# Patient Record
Sex: MALE | Race: WHITE
[De-identification: names, ages, dates, MRNs, and addresses within clinical notes are randomized per-mention and may not be internally consistent; named-entity substitution may affect disease eponyms.]

---

## 2017-04-12 ENCOUNTER — HOSPITAL ENCOUNTER (OUTPATIENT)
Dept: HOSPITAL 80 - SBRMNEURO | Age: 52
End: 2017-04-12
Attending: PSYCHIATRY & NEUROLOGY
Payer: COMMERCIAL

## 2017-04-12 DIAGNOSIS — G47.61: ICD-10-CM

## 2017-04-12 DIAGNOSIS — G47.33: Primary | ICD-10-CM

## 2017-07-22 ENCOUNTER — HOSPITAL ENCOUNTER (OUTPATIENT)
Dept: HOSPITAL 80 - FIMAGING | Age: 52
End: 2017-07-22
Attending: PSYCHIATRY & NEUROLOGY
Payer: COMMERCIAL

## 2017-07-22 DIAGNOSIS — M47.892: ICD-10-CM

## 2017-07-22 DIAGNOSIS — G35: Primary | ICD-10-CM

## 2017-07-22 PROCEDURE — A9585 GADOBUTROL INJECTION: HCPCS

## 2018-01-05 ENCOUNTER — HOSPITAL ENCOUNTER (EMERGENCY)
Dept: HOSPITAL 80 - FED | Age: 53
Discharge: PSYCHIATRIC OR SUBSTANCE USE FACILITY | End: 2018-01-05
Payer: COMMERCIAL

## 2018-01-05 VITALS
SYSTOLIC BLOOD PRESSURE: 117 MMHG | TEMPERATURE: 98.2 F | HEART RATE: 79 BPM | DIASTOLIC BLOOD PRESSURE: 84 MMHG | RESPIRATION RATE: 16 BRPM

## 2018-01-05 VITALS — OXYGEN SATURATION: 93 %

## 2018-01-05 DIAGNOSIS — F10.220: Primary | ICD-10-CM

## 2018-01-05 LAB
PLATELET # BLD: (no result) 10^3/UL (ref 150–400)
PLATELET # BLD: 162 10^3/UL (ref 150–400)

## 2018-01-05 PROCEDURE — G0480 DRUG TEST DEF 1-7 CLASSES: HCPCS

## 2018-01-05 NOTE — EDPHY
H & P


Time Seen by Provider: 01/05/18 21:32


HPI/ROS: 


HPI:  This is a 52-year-old male presents with





Chief Complaint: alcohol detox





Location: psych 


Quality: alcohol detox 


Duration:  8 years


Signs and Symptoms:  No fever, no suicidal ideation, no homicidal ideation, + 

anxiety, + nausea but no vomiting, + mild dull aching headache, 


Timing:  Chronic


Severity:  Moderate-severe


Context:  Patient drinks approximately 12 shots of Tequila daily for the last 8 

years presents with his wife and daughter asking for alcohol detox program and 

generalized medical workup prior for clearance.  Last drink was 13 oz Tequila 2 

hours prior to arrival in the ER. Patient reports that he was admitted earlier 

this year overnight and did not have alcohol withdrawal seizures.  Patient 

reports that he even started drinking in the morning the last week.  He is 

employed.  A him in his wife for currently in counseling and trying to work on 

the relationship.  He reports extreme anxiety but denies suicidal ideation/

homicidal ideation/hallucinations.  He has a diagnosis of multiple sclerosis 

had been on Capsone for several years but has been off of it the last 2 years.  

Patient reports mild tactile disturbances with sweaty palms. 


Modifying Factors:  None





Comment: 








ROS: see HPI


Constitutional: No fever, no chills, no weight loss


Eyes:  No blurred vision


Respiratory:  No shortness of breath, no cough


Cardiovascular:  No chest pain


Gastrointestinal:  + nausea, no vomiting, no diarrhea 


Genitourinary:  No dysuria 


Extremities:  No myalgias 


Neurologic:  No weakness, no numbness


Skin:  No rashes


Hematologic:  No bruising, no bleeding





MEDICAL/SURGICAL/SOCIAL HISTORY: 


Medical history:  Multiple sclerosis


Surgical history:  [place] plateau surgery


Social history:  .  Employed











CONSTITUTIONAL:  Tidy, cooperative, polite adult white male, awake and alert, 

no obvious distress


HEENT: Atraumatic and normocephalic, PERRL, EOMI. Tympanic membranes clear. 

Oropharynx clear, no exudate and moist pink mucosa.  Airway patent.  No 

lymphadenopathy.  No meningismus.


Cardiovascular: Normal S1/S2, tachycardia, regular rhythm, without murmur rub 

or gallop.


PULMONARY/CHEST:  Symmetrical and nontender. Clear to auscultation bilaterally. 

Good air movement. No accessory muscle usage.


ABDOMEN:  Soft, nondistended, nontender, no rebound, no guarding, no peritoneal 

signs, no masses or organomegaly. No CVAT.


EXTREMITIES:  2/2 pulses, strength 5/5, no deformities, no clubbing, no 

cyanosis or edema.


NEUROLOGICAL: no focal neuro deficits.  GCS 15.


SKIN: Warm and dry, no erythema. no rash.  Good capillary refill. 


PSYCH: Poor eye contact, no flight of ideas, organized thought process, 

relatively good insight and judgment, no auditory and visual command 

hallucinations, no suicidal ideation with a plan, no homicidal ideation, no 

paranoia 








Source: Patient, Family (Wife)


Exam Limitations: No limitations


Constitutional: 


 Initial Vital Signs











Temperature (C)  36.9 C   01/05/18 21:30


 


Heart Rate  104 H  01/05/18 21:30


 


Respiratory Rate  18   01/05/18 21:30


 


Blood Pressure  132/93 H  01/05/18 21:30


 


O2 Sat (%)  91 L  01/05/18 21:30








 











O2 Delivery Mode               Room Air


 


O2 (L/minute)                  2














Allergies/Adverse Reactions: 


 





Penicillins Allergy (Verified 01/05/18 21:36)


 


sulfamethoxazole [From Bactrim] Allergy (Verified 01/05/18 21:36)


 


trimethoprim [From Bactrim] Allergy (Verified 01/05/18 21:36)


 








Home Medications: 














 Medication  Instructions  Recorded


 


NK [No Known Home Meds]  01/05/18














Medical Decision Making


ED Course/Re-evaluation: 


Labs, UDS and oral medications given


Given p.o. Librium 25 mg x1. 


Does not meet Detainer or M1 criteria. 


CIWA=1 


Labs reviewed; medically clear


UDS negative


Ethanol 273


TLC consult; [facility] will intake patient first thing in the morning. 

Wife contracts for safety and will drive patient to [facility]. 








This patient was seen under the supervision of my secondary supervising 

physician.  I evaluated care for this patient independently.  Discussed this 

patient with Dr. Bailey who did not see the patient.  Patient's presentation, 

labs/imaging, treatment and plan of care were discussed with secondary 

supervising physician. 


























Differential Diagnosis: 


Differential diagnosis includes but is not limited to alcohol abuse, alcohol 

intoxication, anxiety, functional and situational depression.








- Data Points


Laboratory Results: 


 Laboratory Results





 01/05/18 22:44 





 01/05/18 22:44 





 











  01/05/18 01/05/18 01/05/18





  22:44 22:44 22:06


 


WBC  7.62 10^3/uL 10^3/uL    





   (3.80-9.50)   


 


RBC  4.61 10^6/uL 10^6/uL    





   (4.40-6.38)   


 


Hgb  16.2 g/dL g/dL    





   (13.7-17.5)   


 


Hct  44.0 % %    





   (40.0-51.0)   


 


MCV  95.4 fL fL    





   (81.5-99.8)   


 


MCH  35.1 pg H pg    





   (27.9-34.1)   


 


MCHC  36.8 g/dL H g/dL    





   (32.4-36.7)   


 


RDW  11.7 % %    





   (11.5-15.2)   


 


Plt Count  162 10^3/uL 10^3/uL    





   (150-400)   


 


MPV  8.3 fL L fL    





   (8.7-11.7)   


 


Neut % (Auto)  35.4 % L %    





   (39.3-74.2)   


 


Lymph % (Auto)  48.3 % H %    





   (15.0-45.0)   


 


Mono % (Auto)  13.3 % H %    





   (4.5-13.0)   


 


Eos % (Auto)  1.7 % %    





   (0.6-7.6)   


 


Baso % (Auto)  0.9 % %    





   (0.3-1.7)   


 


Nucleat RBC Rel Count  0.0 % %    





   (0.0-0.2)   


 


Absolute Neuts (auto)  2.70 10^3/uL 10^3/uL    





   (1.70-6.50)   


 


Absolute Lymphs (auto)  3.68 10^3/uL H 10^3/uL    





   (1.00-3.00)   


 


Absolute Monos (auto)  1.01 10^3/uL H 10^3/uL    





   (0.30-0.80)   


 


Absolute Eos (auto)  0.13 10^3/uL 10^3/uL    





   (0.03-0.40)   


 


Absolute Basos (auto)  0.07 10^3/uL 10^3/uL    





   (0.02-0.10)   


 


Absolute Nucleated RBC  0.00 10^3/uL 10^3/uL    





   (0-0.01)   


 


Immature Gran %  0.4 % %    





   (0.0-1.1)   


 


Immature Gran #  0.03 10^3/uL 10^3/uL    





   (0.00-0.10)   


 


Sodium    147 mEq/L H mEq/L  





    (134-144)  


 


Potassium    4.2 mEq/L mEq/L  





    (3.5-5.2)  


 


Chloride    106 mEq/L mEq/L  





    ()  


 


Carbon Dioxide    23 mEq/l mEq/l  





    (22-31)  


 


Anion Gap    18 mEq/L H mEq/L  





    (8-16)  


 


BUN    6 mg/dL L mg/dL  





    (7-23)  


 


Creatinine    0.7 mg/dL mg/dL  





    (0.7-1.3)  


 


Estimated GFR    > 60   





    


 


Glucose    77 mg/dL mg/dL  





    ()  


 


Calcium    9.3 mg/dL mg/dL  





    (8.5-10.4)  


 


Total Bilirubin    1.0 mg/dL mg/dL  





    (0.1-1.4)  


 


Conjugated Bilirubin    0.4 mg/dL mg/dL  





    (0.0-0.5)  


 


Unconjugated Bilirubin    0.6 mg/dL mg/dL  





    (0.0-1.1)  


 


AST    158 IU/L H IU/L  





    (17-59)  


 


ALT    180 IU/L H IU/L  





    (21-72)  


 


Alkaline Phosphatase    73 IU/L IU/L  





    ()  


 


Total Protein    7.4 g/dL g/dL  





    (6.3-8.2)  


 


Albumin    4.6 g/dL g/dL  





    (3.5-5.0)  


 


Urine Opiates Screen      NEGATIVE 





     (NEGATIVE) 


 


Urine Barbiturates      NEGATIVE 





     (NEGATIVE) 


 


Ur Phencyclidine Scrn      NEGATIVE 





     (NEGATIVE) 


 


Ur Amphetamine Screen      NEGATIVE 





     (NEGATIVE) 


 


U Benzodiazepines Scrn      NEGATIVE 





     (NEGATIVE) 


 


Urine Cocaine Screen      NEGATIVE 





     (NEGATIVE) 


 


U Marijuana (THC) Screen      NEGATIVE 





     (NEGATIVE) 


 


Ethyl Alcohol    273 mg/dL H mg/dL  





    (0-10)  














  01/05/18





  22:06


 


WBC  TNP 





  


 


RBC  TNP 





  


 


Hgb  TNP 





  


 


Hct  TNP 





  


 


MCV  TNP 





  


 


MCH  TNP 





  


 


MCHC  TNP 





  


 


RDW  TNP 





  


 


Plt Count  TNP 





  


 


MPV  TNP 





  


 


Neut % (Auto)  TNP 





  


 


Lymph % (Auto)  TNP 





  


 


Mono % (Auto)  TNP 





  


 


Eos % (Auto)  TNP 





  


 


Baso % (Auto)  TNP 





  


 


Nucleat RBC Rel Count  TNP 





  


 


Absolute Neuts (auto)  TNP 





  


 


Absolute Lymphs (auto)  TNP 





  


 


Absolute Monos (auto)  TNP 





  


 


Absolute Eos (auto)  TNP 





  


 


Absolute Basos (auto)  TNP 





  


 


Absolute Nucleated RBC  TNP 





  


 


Immature Gran %  TNP 





  


 


Immature Gran #  TNP 





  


 


Sodium  





  


 


Potassium  





  


 


Chloride  





  


 


Carbon Dioxide  





  


 


Anion Gap  





  


 


BUN  





  


 


Creatinine  





  


 


Estimated GFR  





  


 


Glucose  





  


 


Calcium  





  


 


Total Bilirubin  





  


 


Conjugated Bilirubin  





  


 


Unconjugated Bilirubin  





  


 


AST  





  


 


ALT  





  


 


Alkaline Phosphatase  





  


 


Total Protein  





  


 


Albumin  





  


 


Urine Opiates Screen  





  


 


Urine Barbiturates  





  


 


Ur Phencyclidine Scrn  





  


 


Ur Amphetamine Screen  





  


 


U Benzodiazepines Scrn  





  


 


Urine Cocaine Screen  





  


 


U Marijuana (THC) Screen  





  


 


Ethyl Alcohol  





  











Medications Given: 


 








Discontinued Medications





Chlordiazepoxide HCl (Librium)  25 mg PO EDNOW ONE


   Stop: 01/05/18 21:49


   Last Admin: 01/05/18 21:55 Dose:  25 mg


Sodium Chloride (Ns)  1,000 mls @ 0 mls/hr IV EDNOW ONE; [facility] [place]


   PRN Reason: Protocol


   Stop: 01/05/18 22:29


   Last Admin: 01/05/18 22:33 Dose:  Not Given


Lorazepam (Ativan)  1 mg PO EDNOW ONE


   Stop: 01/05/18 21:49


   Last Admin: 01/05/18 22:12 Dose:  Not Given








Departure





- Departure


Disposition: Other Psych, Not [place]


Clinical Impression: 


Alcohol dependence syndrome


Qualifiers:


 Substance use status: with intoxication Complication of substance-induced 

condition: uncomplicated Qualified Code(s): F10.220 - Alcohol dependence with 

intoxication, uncomplicated





Condition: Fair


Instructions:  Alcohol Dependence (ED)


Additional Instructions: 


Patient is medically clear for admission to [facility]. 


Referrals: 


Steven Harrington MD [Primary Care Provider] - As per Instructions

## 2019-01-18 ENCOUNTER — HOSPITAL ENCOUNTER (OUTPATIENT)
Dept: HOSPITAL 80 - FIMAGING | Age: 54
End: 2019-01-18
Attending: PSYCHIATRY & NEUROLOGY
Payer: COMMERCIAL

## 2019-01-18 DIAGNOSIS — G35: Primary | ICD-10-CM

## 2019-01-18 DIAGNOSIS — R20.0: ICD-10-CM

## 2019-01-18 PROCEDURE — A9585 GADOBUTROL INJECTION: HCPCS

## 2019-06-27 ENCOUNTER — HOSPITAL ENCOUNTER (OUTPATIENT)
Dept: HOSPITAL 80 - FIMAGING | Age: 54
End: 2019-06-27
Payer: COMMERCIAL